# Patient Record
Sex: MALE | Race: WHITE | NOT HISPANIC OR LATINO | URBAN - METROPOLITAN AREA
[De-identification: names, ages, dates, MRNs, and addresses within clinical notes are randomized per-mention and may not be internally consistent; named-entity substitution may affect disease eponyms.]

---

## 2023-02-06 ENCOUNTER — EMERGENCY (EMERGENCY)
Facility: HOSPITAL | Age: 33
LOS: 1 days | Discharge: HOME | End: 2023-02-06
Attending: STUDENT IN AN ORGANIZED HEALTH CARE EDUCATION/TRAINING PROGRAM | Admitting: STUDENT IN AN ORGANIZED HEALTH CARE EDUCATION/TRAINING PROGRAM
Payer: COMMERCIAL

## 2023-02-06 VITALS
HEART RATE: 95 BPM | RESPIRATION RATE: 16 BRPM | OXYGEN SATURATION: 100 % | TEMPERATURE: 98 F | WEIGHT: 210.1 LBS | SYSTOLIC BLOOD PRESSURE: 136 MMHG | HEIGHT: 72 IN | DIASTOLIC BLOOD PRESSURE: 89 MMHG

## 2023-02-06 PROCEDURE — 73660 X-RAY EXAM OF TOE(S): CPT

## 2023-02-06 PROCEDURE — 99283 EMERGENCY DEPT VISIT LOW MDM: CPT

## 2023-02-06 PROCEDURE — 99284 EMERGENCY DEPT VISIT MOD MDM: CPT

## 2023-02-06 PROCEDURE — 73660 X-RAY EXAM OF TOE(S): CPT | Mod: 26,RT

## 2023-02-06 NOTE — ED PROVIDER NOTE - PATIENT PORTAL LINK FT
You can access the FollowMyHealth Patient Portal offered by [facility] by registering at the following website: [email or web address]. By joining [facility]’s FollowMyHealth portal, you will also be able to view your health information using other applications (apps) compatible with our system.

## 2023-02-06 NOTE — ED PROVIDER NOTE - CARE PROVIDER_API CALL
Nirav Edward  [address]  [place] [address]  Phone: [phone]  Fax: (   )    -  Follow Up Time: 4-6 Days

## 2023-02-06 NOTE — ED PROVIDER NOTE - CLINICAL SUMMARY MEDICAL DECISION MAKING FREE TEXT BOX
32 year old 32 year old M presenting with R 4th toe injury, neurovascularly intact, offered pain medications but declines. Will obtain XR to rule out fracture, reassess.

## 2023-02-06 NOTE — ED ADULT NURSE NOTE - OBJECTIVE STATEMENT
Pt presenting s/p stubbing toe 2 days ago. No numbness tingling. Pain to  R 4th toe. No difficulty ambulating.

## 2023-02-06 NOTE — ED PROVIDER NOTE - OBJECTIVE STATEMENT
32 year old M no PMH presenting with R 4th toe injury. Patient jammed R 4th toe on Saturday, has had bruising and pain since. No numbness or tingling. No other trauma. Has not taken anything for pain, as states pain has improved. However, with persistent swelling and bruising. ROS as above.

## 2023-02-06 NOTE — ED PROVIDER NOTE - PROVIDER TOKENS
FREE:[LAST:[Cheyanne],FIRST:[Nirav],PHONE:[phone]],FAX:[phone]   [phone]    -],ADDRESS:[[address] [place] [address] [address] [place] [address]],FOLLOWUP:[4-6 Days]]

## 2023-02-06 NOTE — ED PROVIDER NOTE - PHYSICAL EXAMINATION
General: Well appearing, in no acute distress  HEENT: Normocephalic, atraumatic, extraocular movements intact  CV: Regular rate  Pulm: No respiratory distress, no tachypnea  Abd: Flat, no gross distension  Ext: warm and well perfused  Skin: No gross rashes or lesions, R 4th toe swelling and bruising, minimal ttp, sensation intact, 2+ pulse  Neuro: Alert and oriented, moving all extremities

## 2023-02-06 NOTE — ED PROVIDER NOTE - NSFOLLOWUPINSTRUCTIONS_ED_ALL_ED_FT
Toe Fracture    A foot showing fractures in the bones of the first two toes.   A toe fracture is a break in one of the toe bones (phalanges). A toe fracture may be:  •A crack in the surface of the bone (stress fracture). This often occurs in athletes.      •A break all the way through the bone (complete fracture).        What are the causes?    This condition may be caused by:  •Direct impact, such as from dropping a heavy object on your toe.      •Stubbing your toe.      •Twisting or stretching your toe out of place.      •Overuse or repetitive exercise.        What increases the risk?    You are more likely to develop this condition if you:  •Play contact sports.      •Have a condition that causes the bones to become thin and brittle (osteoporosis).      •Have a low calcium level.        What are the signs or symptoms?  Bones and joints of the foot and toe showing a fracture and blood beneath the toenail.   The main symptoms of this condition are swelling and pain in the toe. Other symptoms include:  •Bruising.      •Stiffness.      •Numbness.      •A change in the way the toe looks.      •Broken bones that poke through the skin.      •Blood beneath the toenail.        How is this diagnosed?    This condition is diagnosed with a physical exam. You may also have X-rays.      How is this treated?    Treatment for this condition depends on the type of fracture and its severity. Treatment may include:  •Taping the broken toe to a toe that is next to it (rhonda taping). This is the most common treatment for fractures in which the bone has not moved out of place (non-displaced fracture).      •Wearing a shoe that has a wide, rigid sole to protect the toe and to limit its movement.      •Wearing a walking cast.      •Having a procedure to move the toe back into place.    •Surgery. This may be needed if the:  •Pieces of broken bone are out of place (displaced).      •Bone breaks through the skin.        •Physical therapy. This is done to help regain movement and strength in the toe.      You may need follow-up X-rays to make sure that the bone is healing well and staying in position.      Follow these instructions at home:    If you have a shoe:     •Wear the shoe as told by your health care provider. Remove it only as told by your health care provider.       • Loosen the shoe if your toes tingle, become numb, or turn cold and blue.      •Keep the shoe clean and dry.      If you have a cast:     • Do not put pressure on any part of the cast until it is fully hardened. This may take several hours.      • Do not stick anything inside the cast to scratch your skin. Doing that increases your risk of infection.      •Check the skin around the cast every day. Tell your health care provider about any concerns.       • You may put lotion on dry skin around the edges of the cast. Do not put lotion on the skin underneath the cast.       •Keep the cast clean and dry.      Bathing     • Do not take baths, swim, or use a hot tub until your health care provider approves. Ask your health care provider if you can take showers.    •If the shoe or cast is not waterproof:  •Do not let it get wet.       •Cover it with a watertight covering when you take a bath or a shower.        Activity     • Do not use the injured foot to support your body weight until your health care provider says that you can. Use crutches as directed.    •Ask your health care provider:  •What activities are safe for you during recovery.      •What activities you need to avoid.        •Do physical therapy exercises as directed.      Driving     • Do not drive or use heavy machinery while taking pain medicine.      • Do not drive while wearing a cast on a foot that you use for driving.        Managing pain, stiffness, and swelling   A bag of ice on a towel on the skin. •If directed, put ice on painful areas:  •Put ice in a plastic bag.    •Place a towel between your skin and the bag.  •If you have a shoe, remove it as told by your health care provider.      •If you have a cast, place a towel between your cast and the bag.        •Leave the ice on for 20 minutes, 2–3 times per day.        •Raise (elevate) the injured area above the level of your heart while you are sitting or lying down.      General instructions   •If your toe was treated with rhonda taping, follow your health care provider's instructions for changing the gauze and tape. Change it more often if:  •The gauze and tape get wet. If this happens, dry the space between the toes.      •The gauze and tape are too tight and cause your toe to become pale or numb.        •If you were not given a protective shoe, wear sturdy, supportive shoes. Your shoes should not pinch your toes and should not fit tightly against your toes.      • Do not use any products that contain nicotine or tobacco, such as cigarettes and e-cigarettes. These can delay bone healing. If you need help quitting, ask your health care provider.      •Take over-the-counter and prescription medicines only as told by your health care provider.      •Keep all follow-up visits as told by your health care provider. This is important.        Contact a health care provider if you have:    •Pain that gets worse or does not get better with medicine.      •A fever.      •A bad smell coming from your cast.        Get help right away if you have:  •Any of the following in your toes or your foot:  •Numbness that gets worse.      •Tingling.      •Coldness.      •Blue skin.        •Redness or swelling that gets worse.      •Pain that suddenly becomes severe.        Summary    •A toe fracture is a break in one of the toe bones (phalanges).      •Treatment depends on how severe your fracture is and how the pieces of the broken bone line up with each other. Treatment may include rhonda taping, wearing a shoe or a cast, or using crutches.      •Ice and elevate your foot to help lessen the pain and swelling.      This information is not intended to replace advice given to you by your health care provider. Make sure you discuss any questions you have with your health care provider.

## 2023-02-09 DIAGNOSIS — S92.531A DISPLACED FRACTURE OF DISTAL PHALANX OF RIGHT LESSER TOE(S), INITIAL ENCOUNTER FOR CLOSED FRACTURE: ICD-10-CM

## 2023-02-09 DIAGNOSIS — M79.674 PAIN IN RIGHT TOE(S): ICD-10-CM
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
## 2023-09-08 NOTE — ED PROVIDER NOTE - NS ED ROS FT
Constitutional: No fever or chills  Eyes: No discharge or drainage  Ears, Nose, Mouth, Throat: No nasal discharge, no sore throat  Cardiovascular: No chest pain, no palpitations  Respiratory: No shortness of breath, no cough  Gastrointestinal: No nausea or vomiting, no abdominal pain, no diarrhea or constipation  Musculoskeletal: R 4th toe pain and swelling  Skin: No rashes or lesions  Neurological: No numbness, weakness, tingling, no headache
h/o anxiety